# Patient Record
Sex: FEMALE | Race: WHITE
[De-identification: names, ages, dates, MRNs, and addresses within clinical notes are randomized per-mention and may not be internally consistent; named-entity substitution may affect disease eponyms.]

---

## 2020-11-22 PROBLEM — Z00.00 ENCOUNTER FOR PREVENTIVE HEALTH EXAMINATION: Status: ACTIVE | Noted: 2020-11-22

## 2021-11-18 ENCOUNTER — TRANSCRIPTION ENCOUNTER (OUTPATIENT)
Age: 66
End: 2021-11-18

## 2022-09-11 ENCOUNTER — NON-APPOINTMENT (OUTPATIENT)
Age: 67
End: 2022-09-11

## 2025-07-25 ENCOUNTER — TRANSCRIPTION ENCOUNTER (OUTPATIENT)
Age: 70
End: 2025-07-25

## 2025-07-25 ENCOUNTER — EMERGENCY (EMERGENCY)
Facility: HOSPITAL | Age: 70
LOS: 1 days | End: 2025-07-25
Attending: STUDENT IN AN ORGANIZED HEALTH CARE EDUCATION/TRAINING PROGRAM
Payer: COMMERCIAL

## 2025-07-25 VITALS
TEMPERATURE: 99 F | OXYGEN SATURATION: 94 % | RESPIRATION RATE: 18 BRPM | HEART RATE: 85 BPM | SYSTOLIC BLOOD PRESSURE: 138 MMHG | DIASTOLIC BLOOD PRESSURE: 55 MMHG

## 2025-07-25 PROCEDURE — 99285 EMERGENCY DEPT VISIT HI MDM: CPT | Mod: 25

## 2025-07-25 PROCEDURE — 13122 CMPLX RPR S/A/L ADDL 5 CM/>: CPT

## 2025-07-25 PROCEDURE — 73130 X-RAY EXAM OF HAND: CPT

## 2025-07-25 PROCEDURE — 99284 EMERGENCY DEPT VISIT MOD MDM: CPT

## 2025-07-25 PROCEDURE — 73130 X-RAY EXAM OF HAND: CPT | Mod: 26,RT

## 2025-07-25 PROCEDURE — 13121 CMPLX RPR S/A/L 2.6-7.5 CM: CPT

## 2025-07-25 RX ORDER — AMOXICILLIN AND CLAVULANATE POTASSIUM 500; 125 MG/1; MG/1
1 TABLET, FILM COATED ORAL ONCE
Refills: 0 | Status: COMPLETED | OUTPATIENT
Start: 2025-07-25 | End: 2025-07-25

## 2025-07-25 RX ORDER — IBUPROFEN 200 MG
1 TABLET ORAL
Qty: 20 | Refills: 0
Start: 2025-07-25 | End: 2025-07-29

## 2025-07-25 RX ORDER — IBUPROFEN 200 MG
1 TABLET ORAL
Refills: 0
Start: 2025-07-25

## 2025-07-25 RX ORDER — IBUPROFEN 200 MG
600 TABLET ORAL ONCE
Refills: 0 | Status: COMPLETED | OUTPATIENT
Start: 2025-07-25 | End: 2025-07-25

## 2025-07-25 RX ORDER — AMOXICILLIN AND CLAVULANATE POTASSIUM 500; 125 MG/1; MG/1
1 TABLET, FILM COATED ORAL
Qty: 20 | Refills: 0
Start: 2025-07-25 | End: 2025-08-03

## 2025-07-25 RX ADMIN — Medication 600 MILLIGRAM(S): at 22:29

## 2025-07-25 RX ADMIN — AMOXICILLIN AND CLAVULANATE POTASSIUM 1 TABLET(S): 500; 125 TABLET, FILM COATED ORAL at 23:16

## 2025-07-25 NOTE — ED PROVIDER NOTE - CARE PROVIDER_API CALL
Bruno Jett)  Plastic Surgery  [address] [place] [id number]  Phone: [phone]  Fax: [phone]  Follow Up Time:

## 2025-07-25 NOTE — ED PROVIDER NOTE - PHYSICAL EXAMINATION
General: non-toxic appearing, in no acute distress  CV: RRR, nl s1/s2.  Resp: CTAB, normal rate and effort  Neuro: sensorimotor intact without deficits   MSK: from flexion/ extension at DIP and PIP of all digits, FROM of upper ext   Skin: 3in linear laceration of R anterior forearm, adipose tissue exposed. also jagged laceration at distal end of R 5th phalanx

## 2025-07-25 NOTE — ED PROVIDER NOTE - CLINICAL SUMMARY MEDICAL DECISION MAKING FREE TEXT BOX
71yo F p/w  dog bite to R forearm and R pinky. dog belongs to friend, dog utd on rabies, pt utd on tetanus. on eval, 3in linear laceration of R anterior forearm appeciated on exam, adipose tissue exposed. also jagged laceration at distal end of R 5th phalanx, full flexion and extension of DIP/PIP of R 5th digits, FROM of upper ext b/l, remainder of PE WNL. VSS. will check xr of finger for fx. ordered ibu for pain, likely to be repaired by plastic 69yo F p/w  dog bite to R forearm and R pinky. dog belongs to friend, dog utd on rabies, pt utd on tetanus. on eval, 3in linear laceration of R anterior forearm appreciated on exam, adipose tissue exposed. also jagged laceration at distal end of R 5th phalanx, full flexion and extension of DIP/PIP of R 5th digits, FROM of upper ext b/l, remainder of PE WNL. VSS. will check xr of finger for fx. ordered ibu for pain, likely to be repaired by plastic    XR neg for fx or foreign body. wound repaired by plastics, Dr Jett. pt to follow up in office on tuesday (4d). discussed wound care and hygiene and return precautions. ordered Augmentin and sent to pharm. pt verbalized understanding and agreement 71yo F p/w  dog bite to R forearm and R pinky. dog belongs to friend, dog utd on rabies, pt utd on tetanus. on eval, 3in linear laceration of R anterior forearm appreciated on exam, adipose tissue exposed. also jagged laceration at distal end of R 5th phalanx, full flexion and extension of DIP/PIP of R 5th digits, FROM of upper ext b/l, remainder of PE WNL. VSS. will check xr of finger for fx. ordered ibu for pain, likely to be repaired by plastic    XR neg for fx or foreign body. wound repaired by plastics, Dr Jett. pt to follow up in office on tuesday (4d). discussed wound care and hygiene and return precautions. ordered Augmentin and sent to pharm. pt verbalized understanding and agreement    ---------  Noreen Canas MD, Attending  71yo F no sig pmhx, pw dog bite to right forearm and R 5th digit by neighbor's dog, who is reportedly utd on rabies. + tetanus updated 2 years ago.   Gen: Well appearing in NAD   Head: NC/AT  Neck: trachea midline  Resp:  No distress  Ext: no deformities  Neuro:  A&O appears non focal  Skin:  +right forearm laceration 3 inches, + distal 5th digit laceration, sensation, rom and strength intact in 5th digit.   Psych:  Normal affect and mood    ddx includes, but is not limited to the following: laceration, fb   plan: xray to ro fb, laceration repair.   update: see progress notes

## 2025-07-25 NOTE — ED PROVIDER NOTE - OBJECTIVE STATEMENT
71yo F denies pmh presents to ED c/o dog bite to her R forearm and R pinky. pt reports she went to pet her friends dogs and dog became aggressive and bite her. pt reports dog bit her two times but immediately let go. bleeding controlled with direct pressure. pt is received tetnus 2y ago, pt reports dog is utd on rabies vaccines. 71yo F denies pmh presents to ED c/o dog bite to her R forearm and R pinky. pt reports she went to pet her friends dogs and dog became aggressive and bite her. pt reports dog bit her two times but immediately let go. bleeding controlled with direct pressure. pt is received tetanus 2y ago, pt reports dog is utd on rabies vaccines.

## 2025-07-25 NOTE — ED ADULT TRIAGE NOTE - CHIEF COMPLAINT QUOTE
pt comes in with c/o dog bite to R forearm and pinky, arm is currently wrapped with no bleeding seen on dressing, pt is axox4 and states dog is up to date with vaccines, no other complaints at this time

## 2025-07-25 NOTE — ED PROVIDER NOTE - ATTENDING APP SHARED VISIT CONTRIBUTION OF CARE
Dr. Canas: I personally saw the patient with the ACP and performed a substantive portion of the visit. I performed a face to face bedside interview with patient regarding history of present illness, review of symptoms and past medical history. I completed an independent physical exam and all aspects of medical decision making. I have discussed patient's plan of care with ACP. I agree with note as stated above, having amended the EMR as needed to reflect my findings. This includes HISTORY OF PRESENT ILLNESS, HIV, PAST MEDICAL/SURGICAL/FAMILY/SOCIAL HISTORY, ALLERGIES AND HOME MEDICATIONS, ROS, PHYSICAL EXAM, MEDICAL DECISION MAKING and any PROGRESS NOTES during the time I functioned as the attending physician for this patient.  SEE MDM

## 2025-07-29 ENCOUNTER — APPOINTMENT (OUTPATIENT)
Dept: ORTHOPEDIC SURGERY | Facility: CLINIC | Age: 70
End: 2025-07-29
Payer: COMMERCIAL

## 2025-07-29 VITALS — BODY MASS INDEX: 23.56 KG/M2 | RESPIRATION RATE: 16 BRPM | HEIGHT: 64 IN | WEIGHT: 138 LBS

## 2025-07-29 DIAGNOSIS — Z80.1 FAMILY HISTORY OF MALIGNANT NEOPLASM OF TRACHEA, BRONCHUS AND LUNG: ICD-10-CM

## 2025-07-29 DIAGNOSIS — S41.159A OPEN BITE OF UNSPECIFIED UPPER ARM, INITIAL ENCOUNTER: ICD-10-CM

## 2025-07-29 DIAGNOSIS — S61.459A OPEN BITE OF UNSPECIFIED HAND, INITIAL ENCOUNTER: ICD-10-CM
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
## 2025-07-29 PROCEDURE — 99203 OFFICE O/P NEW LOW 30 MIN: CPT

## 2025-08-05 ENCOUNTER — APPOINTMENT (OUTPATIENT)
Dept: ORTHOPEDIC SURGERY | Facility: CLINIC | Age: 70
End: 2025-08-05
Payer: COMMERCIAL

## 2025-08-05 VITALS — BODY MASS INDEX: 23.56 KG/M2 | RESPIRATION RATE: 16 BRPM | WEIGHT: 138 LBS | HEIGHT: 64 IN

## 2025-08-05 PROCEDURE — 99213 OFFICE O/P EST LOW 20 MIN: CPT

## 2025-08-05 PROCEDURE — 15853 REMOVAL SUTR/STAPL XREQ ANES: CPT

## 2025-08-12 ENCOUNTER — APPOINTMENT (OUTPATIENT)
Dept: ORTHOPEDIC SURGERY | Facility: CLINIC | Age: 70
End: 2025-08-12
Payer: COMMERCIAL
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
## 2025-08-12 PROCEDURE — 99213 OFFICE O/P EST LOW 20 MIN: CPT
